# Patient Record
Sex: FEMALE | Race: WHITE
[De-identification: names, ages, dates, MRNs, and addresses within clinical notes are randomized per-mention and may not be internally consistent; named-entity substitution may affect disease eponyms.]

---

## 2018-01-15 ENCOUNTER — HOSPITAL ENCOUNTER (EMERGENCY)
Dept: HOSPITAL 62 - ER | Age: 23
Discharge: HOME | End: 2018-01-15
Payer: COMMERCIAL

## 2018-01-15 VITALS — SYSTOLIC BLOOD PRESSURE: 118 MMHG | DIASTOLIC BLOOD PRESSURE: 75 MMHG

## 2018-01-15 DIAGNOSIS — Z79.3: ICD-10-CM

## 2018-01-15 DIAGNOSIS — R51: ICD-10-CM

## 2018-01-15 DIAGNOSIS — R53.83: ICD-10-CM

## 2018-01-15 DIAGNOSIS — M79.1: ICD-10-CM

## 2018-01-15 DIAGNOSIS — R50.9: ICD-10-CM

## 2018-01-15 DIAGNOSIS — J02.0: Primary | ICD-10-CM

## 2018-01-15 DIAGNOSIS — R13.10: ICD-10-CM

## 2018-01-15 LAB
A TYPE INFLUENZA AG: NEGATIVE
B INFLUENZA AG: NEGATIVE

## 2018-01-15 PROCEDURE — 87880 STREP A ASSAY W/OPTIC: CPT

## 2018-01-15 PROCEDURE — 87804 INFLUENZA ASSAY W/OPTIC: CPT

## 2018-01-15 PROCEDURE — 99283 EMERGENCY DEPT VISIT LOW MDM: CPT

## 2018-01-15 NOTE — ER DOCUMENT REPORT
ED General





- General


Chief Complaint: Sore Throat


Stated Complaint: SORE THROAT


Time Seen by Provider: 01/15/18 17:41


Mode of Arrival: Ambulatory


Information source: Patient


TRAVEL OUTSIDE OF THE [place] IN LAST 30 DAYS: No





- HPI


Notes: 





22-year-old female presents today with complaints of fevers, myalgias, sore 

throat x 3-4 days. Pain 6/10, scratchy and constant. Tried salt water gargle 

without full relief. Denies fevers, but reports chills. Hard to swallow foods, 

eating soft foods. Reports headache and fatigue. Denies hx of mono. Better with 

cold fluids, worse with eating hot foods. Denies any rashes. Was unable to see 

PCP today.  Denies any chest pain, shortness of breath, nausea, vomiting, 

diarrhea, abdominal pain, dysuria, lightheadedness, dizziness, blurred vision, 

double vision, loss of vision.  Boyfriend was just diagnosed with strep, 

finishing course of antibiotics.  Patient came to visit and noticed symptoms 

shortly after seeing her boyfriend.  LMP 2 days ago.  Denies pregnancy.  

Patient is on oral birth control.  Patient did not get flu shot this year.








Past Medical History





- General


Information source: Patient





- Social History


Smoking Status: Never Smoker


Chew tobacco use (# tins/day): No


Frequency of alcohol use: Rare


Drug Abuse: None


Family History: None


Patient has suicidal ideation: No


Patient has homicidal ideation: No


Renal/ Medical History: Denies: Hx Peritoneal Dialysis





Review of Systems





- Review of Systems


Constitutional: See HPI


EENT: See HPI


Cardiovascular: No symptoms reported


Respiratory: No symptoms reported


Gastrointestinal: No symptoms reported


Genitourinary: No symptoms reported


Female Genitourinary: No symptoms reported


Musculoskeletal: No symptoms reported


Skin: No symptoms reported


Hematologic/Lymphatic: No symptoms reported


Neurological/Psychological: No symptoms reported





Physical Exam





- Vital signs


Vitals: 


 











Temp Pulse Resp BP Pulse Ox


 


 99.5 F   116 H  16   132/79 H  100 


 


 01/15/18 17:32  01/15/18 17:32  01/15/18 17:32  01/15/18 17:32  01/15/18 17:32











Interpretation: Tachycardic





- Notes


Notes: 





PHYSICAL EXAMINATION:





GENERAL: Well-appearing, well-nourished and in no acute distress.





HEAD: Atraumatic, normocephalic.





EYES: Pupils equal round and reactive to light, extraocular movements intact, 

conjunctiva are normal.





ENT:tympanic membranes are normal appearing with pearly color, normal-appearing 

landmarks and normal light reflex. Hearing is grossly intact. The nasal mucosa 

is moist. The septum is midline. There is no evidence of septal hematoma. The 

turbinates are without abnormality. No obvious abnormalities to the lips. The 

teeth are unremarkable. The gingivae are without any obvious evidence of 

infection. The oral mucosa is moist and pink. There are no obvious masses to 

the hard or soft palate. The uvula is midline. The salivary glands appear 

unremarkable. The tongue is midline. The posterior pharynx is with erythema. 

The tonsils are swollen bilaterally. Noted erythema and exudate on tonsils 

bilaterally. no angioedema no drooling no trismus bilateral arches equal.  

Uvula midline.





NECK: Normal range of motion, supple without lymphadenopathy





LUNGS: Breath sounds clear to auscultation bilaterally and equal.  No wheezes 

rales or rhonchi.





HEART: Regular rate and rhythm without murmurs





ABDOMEN: Soft, nontender, nondistended abdomen.  No guarding, no rebound.  No 

masses appreciated.





Female : deferred





Musculoskeletal: Normal range of motion, no pitting or edema.  No cyanosis.





NEUROLOGICAL: Cranial nerves grossly intact.  Normal speech, normal gait.  

Normal sensory, motor exams





PSYCH: Normal mood, normal affect.





SKIN: Warm, Dry, normal turgor, no rashes or lesions noted.





Course





- Re-evaluation


Re-evalutation: 


Rechecked the patient who is resting comfortably. On re-exam, patient is 

symptomatically improved. Discussed the results of the labs  as well as the 

diagnosis at great length. Discussed the need to return to the ER for any new 

or worsening sx. Patient understands to take the Rx as directed. All questions 

answered. Patient comfortable with the decision to go home.


01/15/18 18:26








- Vital Signs


Vital signs: 


 











Temp Pulse Resp BP Pulse Ox


 


 99.5 F   116 H  16   132/79 H  100 


 


 01/15/18 17:32  01/15/18 17:32  01/15/18 17:32  01/15/18 17:32  01/15/18 17:32














Discharge





- Discharge


Clinical Impression: 


 Strep pharyngitis





Condition: Good


Disposition: HOME, SELF-CARE


Instructions:  Strep Throat ([facility])


Additional Instructions: 


Sore Throat





     Sore throats may be caused by viruses, bacteria, or fungi.  Most are due 

to a virus, and must get better on their own.  Bacterial sore throats, 

particularly those due to "strep," need treatment with antibiotics.


     If an antibiotic is prescribed, be sure to take the medication for a full 

10 days.  Failure to take the antibiotic can result in complications such as 

rheumatic fever.  Sometimes, an injection of antibiotics is given instead of 

pills or liquid.  This single "shot" is equal in effectiveness to the oral 

medication.


     To relieve symptoms, take acetaminophen for pain.  Sip clear liquids 

frequently, or eat popsicles or ice chips.  Anesthetic sprays or lozenges may 

help.  Make sure the air in the room is not too dry. Avoid using decongestants 

or antihistamines.


     Call the doctor if there is no improvement in two days, or if you have 

difficulty breathing, increasing throat pain, high fever, rash, or frequent 

vomiting.


Return immediately for any new or worsening symptoms.





Follow up with primary care provider, call tomorrow to make followup 

appointment.